# Patient Record
Sex: MALE | Race: WHITE | NOT HISPANIC OR LATINO | URBAN - METROPOLITAN AREA
[De-identification: names, ages, dates, MRNs, and addresses within clinical notes are randomized per-mention and may not be internally consistent; named-entity substitution may affect disease eponyms.]

---

## 2024-01-08 NOTE — ASU PATIENT PROFILE, ADULT - FALL HARM RISK - UNIVERSAL INTERVENTIONS
Bed in lowest position, wheels locked, appropriate side rails in place/Call bell, personal items and telephone in reach/Instruct patient to call for assistance before getting out of bed or chair/Non-slip footwear when patient is out of bed/[place] to call system/Physically safe environment - no spills, clutter or unnecessary equipment/Purposeful Proactive Rounding/Room/bathroom lighting operational, light cord in reach Bed in lowest position, wheels locked, appropriate side rails in place/Call bell, personal items and telephone in reach/Instruct patient to call for assistance before getting out of bed or chair/Non-slip footwear when patient is out of bed/[place] to call system/Physically safe environment - no spills, clutter or unnecessary equipment/Purposeful Proactive Rounding/Room/bathroom lighting operational, light cord in reach

## 2024-01-08 NOTE — ASU PATIENT PROFILE, ADULT - NSICDXPASTSURGICALHX_GEN_ALL_CORE_FT
PAST SURGICAL HISTORY:  [place] teeth extracted      PAST SURGICAL HISTORY:  [place] teeth extracted

## 2024-01-09 ENCOUNTER — OUTPATIENT (OUTPATIENT)
Dept: OUTPATIENT SERVICES | Facility: HOSPITAL | Age: 19
LOS: 1 days | Discharge: HOME | End: 2024-01-09
Payer: COMMERCIAL

## 2024-01-09 VITALS
HEART RATE: 69 BPM | OXYGEN SATURATION: 99 % | SYSTOLIC BLOOD PRESSURE: 113 MMHG | DIASTOLIC BLOOD PRESSURE: 62 MMHG | RESPIRATION RATE: 14 BRPM

## 2024-01-09 VITALS
RESPIRATION RATE: 14 BRPM | HEART RATE: 65 BPM | TEMPERATURE: 98 F | DIASTOLIC BLOOD PRESSURE: 58 MMHG | OXYGEN SATURATION: 99 % | WEIGHT: 162.26 LBS | HEIGHT: 75 IN | SYSTOLIC BLOOD PRESSURE: 111 MMHG

## 2024-01-09 DIAGNOSIS — K08.409 PARTIAL LOSS OF TEETH, UNSPECIFIED CAUSE, UNSPECIFIED CLASS: Chronic | ICD-10-CM

## 2024-01-09 PROCEDURE — 88304 TISSUE EXAM BY PATHOLOGIST: CPT | Mod: 26

## 2024-01-09 RX ORDER — FENTANYL CITRATE 50 UG/ML
25 INJECTION INTRAVENOUS
Refills: 0 | Status: DISCONTINUED | OUTPATIENT
Start: 2024-01-09 | End: 2024-01-09

## 2024-01-09 RX ORDER — ONDANSETRON 8 MG/1
4 TABLET, FILM COATED ORAL ONCE
Refills: 0 | Status: DISCONTINUED | OUTPATIENT
Start: 2024-01-09 | End: 2024-01-09

## 2024-01-09 RX ORDER — SODIUM CHLORIDE 9 MG/ML
500 INJECTION, SOLUTION INTRAVENOUS
Refills: 0 | Status: DISCONTINUED | OUTPATIENT
Start: 2024-01-09 | End: 2024-01-09

## 2024-01-09 NOTE — ASU DISCHARGE PLAN (ADULT/PEDIATRIC) - CARE PROVIDER_API CALL
Abhilash Bey  Otolaryngology  [address]  [place] [address]  Phone: [phone]  Fax: [phone]  Established Patient  Follow Up Time: 1 month   Abhilash Bey  Otolaryngology  [address]  [place] [address]  Phone: [phone]  Fax: [phone]  Established Patient  Follow Up Time: 1 month

## 2024-01-09 NOTE — ASU PREOP CHECKLIST - LAST TOOK
Cbc,cmp,lipids, & A1c recvd from [facility]-  No tsh or urine micro albumin.   Will call Monday to inquire.   
Spoke with [facility] they have original order- TSH  And urine micro albumin were not ordered  -looks like TSH was for endo- do you want to order these labs?  
clears

## 2024-01-09 NOTE — ASU DISCHARGE PLAN (ADULT/PEDIATRIC) - NS MD DC FALL RISK RISK
For information on Fall & Injury Prevention, visit: [email or web address]/[email or web address]-[email or web address]-mobility OR  [email or web address].[email or web address]/news/fall-prevention-tips-[email or web address]-injury OR  https://www.cdc.gov/steadi/patient.html For information on Fall & Injury Prevention, visit: [email or web address]://www.[facility]/news/fall-prevention-[email or web address]-[email or web address]-[email or web address]-mobility OR  [email or web address]://www.[facility]/news/fall-prevention-tips-to-avoid-injury OR  https://www.cdc.gov/steadi/patient.html

## 2024-01-09 NOTE — ASU DISCHARGE PLAN (ADULT/PEDIATRIC) - NURSING INSTRUCTIONS
DO NOT take any Tylenol (Acetaminophen) or narcotics containing Tylenol until after  4:00opm . You received Tylenol during your operation and it can cause damage to your liver if too much is taken within a 24 hour time period.

## 2024-01-09 NOTE — BRIEF OPERATIVE NOTE - NSICDXBRIEFPROCEDURE_GEN_ALL_CORE_FT
PROCEDURES:  Tonsillectomy, adult 09-Jan-2024 09:46:18  Abhilash Bey   PROCEDURES:  Tonsillectomy, adult 09-Jan-2024 09:46:18  Abhilahs Bey

## 2024-01-09 NOTE — ASU DISCHARGE PLAN (ADULT/PEDIATRIC) - PROVIDER TOKENS
PROVIDER:[TOKEN:[[id number]],FOLLOWUP:[1 month],ESTABLISHEDPATIENT:[T]] PROVIDER:[TOKEN:[[id number]],FOLLOWUP:[1 month],ESTABLISHEDPATIENT:[T]]

## 2024-01-18 LAB — SURGICAL PATHOLOGY STUDY: SIGNIFICANT CHANGE UP

## 2024-11-20 NOTE — PRE-ANESTHESIA EVALUATION ADULT - NSANTHBMIRD_ENT_A_CORE
Initiated PA for Ozempic 2mg through [employer].    Your demographic data has been sent to [facility] successfully!  [facility] typically takes 5-10 minutes to respond, but it may take a little longer in some cases. You will be notified by email when available. You can also check for an update later by opening this request from your dashboard. Please do not fax or call [facility] to resubmit this request. If you need assistance, please chat with [employer] or call us at [phone].  If it has been longer than 24 hours, please reach out to [facility].   No

## (undated) DEVICE — SOL ANTI FOG

## (undated) DEVICE — SLV COMPRESSION KNEE MED

## (undated) DEVICE — GLV 7.5 PROTEXIS (WHITE)

## (undated) DEVICE — PACK TONSIL ADENOID

## (undated) DEVICE — WARMING BLANKET LOWER ADULT

## (undated) DEVICE — MARKING PEN W RULER

## (undated) DEVICE — URETERAL CATH RED RUBBER 8FR

## (undated) DEVICE — ELCTR BOVIE TIP BLADE INSULATED 2.8" EDGE WITH SAFETY

## (undated) DEVICE — PETRI DISH MED 3.5"

## (undated) DEVICE — URETERAL CATH RED RUBBER 12FR (WHITE)

## (undated) DEVICE — ELCTR COAGULATOR HANDSWITCHING 10FR

## (undated) DEVICE — ELCTR BOVIE SUCTION 8FR 6"